# Patient Record
(demographics unavailable — no encounter records)

---

## 2025-01-02 NOTE — HISTORY OF PRESENT ILLNESS
[de-identified] : 62-year-old female presenting for annual wellness visit and establish BP elevated today ran out of meds a month ago needs pneumonia and shingles vacc Due for mammogram Lives with her daughter and states that her mother and son are in her home country which has been very difficult

## 2025-01-02 NOTE — HEALTH RISK ASSESSMENT
[Good] : ~his/her~  mood as  good [Yes] : Yes [Monthly or less (1 pt)] : Monthly or less (1 point) [1 or 2 (0 pts)] : 1 or 2 (0 points) [Never (0 pts)] : Never (0 points) [No] : In the past 12 months have you used drugs other than those required for medical reasons? No [No falls in past year] : Patient reported no falls in the past year [0] : 2) Feeling down, depressed, or hopeless: Not at all (0) [PHQ-2 Negative - No further assessment needed] : PHQ-2 Negative - No further assessment needed [Patient reported mammogram was normal] : Patient reported mammogram was normal [Patient reported colonoscopy was normal] : Patient reported colonoscopy was normal [With Family] : lives with family [Employed] : employed [] :  [# Of Children ___] : has [unfilled] children [Feels Safe at Home] : Feels safe at home [Smoke Detector] : smoke detector [Carbon Monoxide Detector] : carbon monoxide detector [Seat Belt] :  uses seat belt [Never] : Never [Audit-CScore] : 1 [[id number]] : 0 [Change in mental status noted] : No change in mental status noted [Reports changes in hearing] : Reports no changes in hearing [Reports changes in vision] : Reports no changes in vision [Reports changes in dental health] : Reports no changes in dental health [MammogramDate] : 01/2022 [ColonoscopyDate] : 01/2022

## 2025-01-02 NOTE — HISTORY OF PRESENT ILLNESS
[de-identified] : 62-year-old female presenting for annual wellness visit and establish BP elevated today ran out of meds a month ago needs pneumonia and shingles vacc Due for mammogram Lives with her daughter and states that her mother and son are in her home country which has been very difficult

## 2025-01-02 NOTE — HISTORY OF PRESENT ILLNESS
[FreeTextEntry1] : Follow-up blood pressure [de-identified] : 62-year-old female presenting for follow-up of her blood pressure.  She has not increased her medication Still denying any red flag signs or symptoms

## 2025-01-02 NOTE — HISTORY OF PRESENT ILLNESS
[de-identified] : 62-year-old female with visit today to follow-up her blood pressure It still remains elevated but is improved She continues to feel well She is taking her lisinopril as prescribed

## 2025-01-02 NOTE — HEALTH RISK ASSESSMENT
[Good] : ~his/her~  mood as  good [Yes] : Yes [Monthly or less (1 pt)] : Monthly or less (1 point) [1 or 2 (0 pts)] : 1 or 2 (0 points) [Never (0 pts)] : Never (0 points) [No] : In the past 12 months have you used drugs other than those required for medical reasons? No [No falls in past year] : Patient reported no falls in the past year [0] : 2) Feeling down, depressed, or hopeless: Not at all (0) [PHQ-2 Negative - No further assessment needed] : PHQ-2 Negative - No further assessment needed [Patient reported mammogram was normal] : Patient reported mammogram was normal [Patient reported colonoscopy was normal] : Patient reported colonoscopy was normal [With Family] : lives with family [Employed] : employed [] :  [# Of Children ___] : has [unfilled] children [Feels Safe at Home] : Feels safe at home [Smoke Detector] : smoke detector [Carbon Monoxide Detector] : carbon monoxide detector [Seat Belt] :  uses seat belt [Never] : Never [Audit-CScore] : 1 [[id number]] : 0 [Change in mental status noted] : No change in mental status noted [Reports changes in hearing] : Reports no changes in hearing [Reports changes in vision] : Reports no changes in vision [Reports changes in dental health] : Reports no changes in dental health [MammogramDate] : 01/2022 [ColonoscopyDate] : 01/2022

## 2025-01-02 NOTE — HISTORY OF PRESENT ILLNESS
[de-identified] : 62-year-old female with visit today to follow-up her blood pressure It still remains elevated but is improved She continues to feel well She is taking her lisinopril as prescribed

## 2025-01-13 NOTE — HISTORY OF PRESENT ILLNESS
[de-identified] : 62-year-old female presenting for follow-up of her blood pressure.  She has increased her medication but only amlodipine from 5mg to 10mg Still denying any red flag signs or symptoms

## 2025-01-13 NOTE — HISTORY OF PRESENT ILLNESS
[de-identified] : 62-year-old female presenting for follow-up of her blood pressure.  She has increased her medication but only amlodipine from 5mg to 10mg Still denying any red flag signs or symptoms